# Patient Record
Sex: FEMALE
[De-identification: names, ages, dates, MRNs, and addresses within clinical notes are randomized per-mention and may not be internally consistent; named-entity substitution may affect disease eponyms.]

---

## 2019-09-05 ENCOUNTER — HOSPITAL ENCOUNTER (EMERGENCY)
Dept: HOSPITAL 53 - M ED | Age: 29
Discharge: HOME | End: 2019-09-05
Payer: COMMERCIAL

## 2019-09-05 VITALS — SYSTOLIC BLOOD PRESSURE: 102 MMHG | DIASTOLIC BLOOD PRESSURE: 62 MMHG

## 2019-09-05 VITALS — WEIGHT: 197.53 LBS | HEIGHT: 64 IN | BODY MASS INDEX: 33.72 KG/M2

## 2019-09-05 DIAGNOSIS — O99.330: ICD-10-CM

## 2019-09-05 DIAGNOSIS — Z88.1: ICD-10-CM

## 2019-09-05 DIAGNOSIS — Z79.899: ICD-10-CM

## 2019-09-05 DIAGNOSIS — O20.8: Primary | ICD-10-CM

## 2019-09-05 LAB
BASOPHILS # BLD AUTO: 0 10^3/UL (ref 0–0.2)
BASOPHILS NFR BLD AUTO: 0.4 % (ref 0–1)
BUN SERPL-MCNC: 9 MG/DL (ref 7–18)
CALCIUM SERPL-MCNC: 9 MG/DL (ref 8.5–10.1)
CHLORIDE SERPL-SCNC: 107 MEQ/L (ref 98–107)
CO2 SERPL-SCNC: 28 MEQ/L (ref 21–32)
CREAT SERPL-MCNC: 0.72 MG/DL (ref 0.55–1.3)
EOSINOPHIL # BLD AUTO: 0.1 10^3/UL (ref 0–0.5)
EOSINOPHIL NFR BLD AUTO: 2 % (ref 0–3)
GFR SERPL CREATININE-BSD FRML MDRD: > 60 ML/MIN/{1.73_M2} (ref 60–?)
GLUCOSE SERPL-MCNC: 100 MG/DL (ref 70–100)
HCT VFR BLD AUTO: 38.4 % (ref 36–47)
HGB BLD-MCNC: 12.8 G/DL (ref 12–15.5)
LYMPHOCYTES # BLD AUTO: 2 10^3/UL (ref 1.5–5)
LYMPHOCYTES NFR BLD AUTO: 27.3 % (ref 24–44)
MCH RBC QN AUTO: 30.3 PG (ref 27–33)
MCHC RBC AUTO-ENTMCNC: 33.3 G/DL (ref 32–36.5)
MCV RBC AUTO: 90.8 FL (ref 80–96)
MONOCYTES # BLD AUTO: 0.4 10^3/UL (ref 0–0.8)
MONOCYTES NFR BLD AUTO: 5.9 % (ref 0–5)
NEUTROPHILS # BLD AUTO: 4.6 10^3/UL (ref 1.5–8.5)
NEUTROPHILS NFR BLD AUTO: 64.1 % (ref 36–66)
PLATELET # BLD AUTO: 248 10^3/UL (ref 150–450)
POTASSIUM SERPL-SCNC: 3.8 MEQ/L (ref 3.5–5.1)
RBC # BLD AUTO: 4.23 10^6/UL (ref 4–5.4)
SODIUM SERPL-SCNC: 141 MEQ/L (ref 136–145)
WBC # BLD AUTO: 7.2 10^3/UL (ref 4–10)

## 2019-09-05 PROCEDURE — 86850 RBC ANTIBODY SCREEN: CPT

## 2019-09-05 PROCEDURE — 86900 BLOOD TYPING SEROLOGIC ABO: CPT

## 2019-09-05 PROCEDURE — 99283 EMERGENCY DEPT VISIT LOW MDM: CPT

## 2019-09-05 PROCEDURE — 85025 COMPLETE CBC W/AUTO DIFF WBC: CPT

## 2019-09-05 PROCEDURE — 80048 BASIC METABOLIC PNL TOTAL CA: CPT

## 2019-09-05 PROCEDURE — 93976 VASCULAR STUDY: CPT

## 2019-09-05 PROCEDURE — 84702 CHORIONIC GONADOTROPIN TEST: CPT

## 2019-09-05 PROCEDURE — 76801 OB US < 14 WKS SINGLE FETUS: CPT

## 2019-09-05 PROCEDURE — 96372 THER/PROPH/DIAG INJ SC/IM: CPT

## 2019-09-05 PROCEDURE — 76817 TRANSVAGINAL US OBSTETRIC: CPT

## 2019-09-05 PROCEDURE — 86901 BLOOD TYPING SEROLOGIC RH(D): CPT

## 2019-09-05 NOTE — REP
Clinical:  Spotting and pelvic pain for dating and viability.

 

Technique:  Transabdominal and transvaginal first trimester obstetrical

ultrasound with color Doppler evaluation.

 

Findings:

Bladder is collapsed.

 

Heterogeneous anteverted uterus measures 9.4 x 4.6 x 6.1 cm.  Endometrial complex

measures 14 mm thickness.  No intrauterine pregnancy identified.  The bilateral

ovaries are normal in appearance and vascularity without torsion.  Right ovary

measures 3.2 x 2.1 x 3.5 cm; RI 0.49.  Left ovary measures 3.6 x 2.1 x 1.6 cm;

venous flow noted.  Small amount of free fluid in the pelvis.

 

Impression:

No intrauterine pregnancy identified.  Differential diagnosis includes early

pregnancy, missed , and less likely ectopic pregnancy.  Correlation with

serial HCG levels recommended along with follow-up ultrasound as necessary.

 

 

Electronically Signed by

Taurus Jose MD 2019 04:52 P

## 2019-09-07 ENCOUNTER — HOSPITAL ENCOUNTER (OUTPATIENT)
Dept: HOSPITAL 53 - M LAB | Age: 29
End: 2019-09-07
Attending: PHYSICIAN ASSISTANT
Payer: COMMERCIAL

## 2019-09-07 DIAGNOSIS — O46.90: Primary | ICD-10-CM

## 2019-09-07 DIAGNOSIS — Z3A.22: ICD-10-CM

## 2019-09-19 ENCOUNTER — HOSPITAL ENCOUNTER (EMERGENCY)
Dept: HOSPITAL 53 - M ED | Age: 29
Discharge: HOME | End: 2019-09-19
Payer: COMMERCIAL

## 2019-09-19 VITALS — WEIGHT: 195.42 LBS | BODY MASS INDEX: 33.36 KG/M2 | HEIGHT: 64 IN

## 2019-09-19 VITALS — SYSTOLIC BLOOD PRESSURE: 112 MMHG | DIASTOLIC BLOOD PRESSURE: 68 MMHG

## 2019-09-19 DIAGNOSIS — F17.210: ICD-10-CM

## 2019-09-19 DIAGNOSIS — O20.9: Primary | ICD-10-CM

## 2019-09-19 DIAGNOSIS — O99.331: ICD-10-CM

## 2019-09-19 DIAGNOSIS — Z3A.01: ICD-10-CM

## 2019-09-19 DIAGNOSIS — Z88.2: ICD-10-CM

## 2019-09-19 LAB
B-HCG SERPL-ACNC: (no result) MIU/ML
BASOPHILS # BLD AUTO: 0 10^3/UL (ref 0–0.2)
BASOPHILS NFR BLD AUTO: 0.5 % (ref 0–1)
BUN SERPL-MCNC: 8 MG/DL (ref 7–18)
CALCIUM SERPL-MCNC: 9.2 MG/DL (ref 8.5–10.1)
CHLORIDE SERPL-SCNC: 106 MEQ/L (ref 98–107)
CO2 SERPL-SCNC: 28 MEQ/L (ref 21–32)
CREAT SERPL-MCNC: 0.64 MG/DL (ref 0.55–1.3)
EOSINOPHIL # BLD AUTO: 0.1 10^3/UL (ref 0–0.5)
EOSINOPHIL NFR BLD AUTO: 1.7 % (ref 0–3)
GFR SERPL CREATININE-BSD FRML MDRD: > 60 ML/MIN/{1.73_M2} (ref 60–?)
GLUCOSE SERPL-MCNC: 92 MG/DL (ref 70–100)
HCT VFR BLD AUTO: 36.7 % (ref 36–47)
HGB BLD-MCNC: 12.3 G/DL (ref 12–15.5)
LYMPHOCYTES # BLD AUTO: 2 10^3/UL (ref 1.5–5)
LYMPHOCYTES NFR BLD AUTO: 30.8 % (ref 24–44)
MCH RBC QN AUTO: 30.1 PG (ref 27–33)
MCHC RBC AUTO-ENTMCNC: 33.5 G/DL (ref 32–36.5)
MCV RBC AUTO: 89.7 FL (ref 80–96)
MONOCYTES # BLD AUTO: 0.4 10^3/UL (ref 0–0.8)
MONOCYTES NFR BLD AUTO: 6.8 % (ref 0–5)
NEUTROPHILS # BLD AUTO: 3.9 10^3/UL (ref 1.5–8.5)
NEUTROPHILS NFR BLD AUTO: 59.9 % (ref 36–66)
PLATELET # BLD AUTO: 203 10^3/UL (ref 150–450)
POTASSIUM SERPL-SCNC: 4.1 MEQ/L (ref 3.5–5.1)
RBC # BLD AUTO: 4.09 10^6/UL (ref 4–5.4)
SODIUM SERPL-SCNC: 139 MEQ/L (ref 136–145)
WBC # BLD AUTO: 6.5 10^3/UL (ref 4–10)

## 2019-09-19 NOTE — REP
First trimester obstetric ultrasound for fetal dating and viability and for

cramping/spotting:

Comparison is 09/05/2019.

There is an intrauterine gestational sac with a fetal pole.

There is fetal cardiac activity, the fetal heart rate is 113 beats per minute.

The fetal pole crown-rump length is 0.6 cm.  This corresponds to a gestational

age of 6 weeks 3 days.  The MECHE is 05/11/2020.

The gestational age by LMP is 8 weeks 1 day/MECHE 04/29/2020.

There is no subchorionic hematoma.

There is a right ovarian hemorrhagic cyst, likely a corpus luteum, measuring 1.7

cm in diameter.

The left adnexa is unremarkable.

There is no free fluid in the pelvis.

Impression:

683-day viable intrauterine gestation.

1.7 cm right adnexal corpus luteum.

No subchorionic hematoma.

No free pelvic fluid.

 

 

Electronically Signed by

Jono Granda MD 09/19/2019 12:57 P